# Patient Record
Sex: MALE | Race: BLACK OR AFRICAN AMERICAN
[De-identification: names, ages, dates, MRNs, and addresses within clinical notes are randomized per-mention and may not be internally consistent; named-entity substitution may affect disease eponyms.]

---

## 2019-03-07 ENCOUNTER — HOSPITAL ENCOUNTER (EMERGENCY)
Dept: HOSPITAL 72 - EMR | Age: 24
Discharge: HOME | End: 2019-03-07
Payer: COMMERCIAL

## 2019-03-07 VITALS — HEIGHT: 73 IN | BODY MASS INDEX: 21.87 KG/M2 | WEIGHT: 165 LBS

## 2019-03-07 VITALS — SYSTOLIC BLOOD PRESSURE: 118 MMHG | DIASTOLIC BLOOD PRESSURE: 71 MMHG

## 2019-03-07 DIAGNOSIS — Z87.891: ICD-10-CM

## 2019-03-07 DIAGNOSIS — J40: Primary | ICD-10-CM

## 2019-03-07 PROCEDURE — 99282 EMERGENCY DEPT VISIT SF MDM: CPT

## 2019-03-07 NOTE — EMERGENCY ROOM REPORT
History of Present Illness


General


Chief Complaint:  Flu Like Symptoms


Source:  Patient





Present Illness


HPI


23-year-old male presents to the emergency department complaining of persistent 

10/10 in severity productive cough 3-4 days.  Patient denies fevers he reports 

chills he denies history of asthma he does report history of smoking 

specifically noting that he smokes from a bottle regularly.  Patient reports 

mild 5 out of 10 in severity sore throat which is exacerbated upon coughing or 

swallowing. Denies  ear pain, high fevers, lethargy, neck pain/stiffness, 

irritability, photophobia dehydration, N/V/D. Denies Cp, Palpitations, LOC, AMS

, seizures, paresthesias, or changes in Hearing or vision, no Sudden severe HA. 

Denies hx of smoking, asthma or COPD.


Allergies:  


Coded Allergies:  


     No Known Allergies (Unverified , 3/7/19)





Patient History


Past Medical History:  see triage record


Past Surgical History:  none


Pertinent Family History:  none


Reviewed Nursing Documentation:  PMH: Agreed; PSxH: Agreed





Nursing Documentation-PMH


Past Medical History:  No Stated History





Review of Systems


All Other Systems:  negative except mentioned in HPI





Physical Exam





Vital Signs








  Date Time  Temp Pulse Resp B/P (MAP) Pulse Ox O2 Delivery O2 Flow Rate FiO2


 


3/7/19 13:45 98.2 86 18 118/71 97 Room Air  








Sp02 EP Interpretation:  reviewed, normal


General Appearance:  no apparent distress, alert, GCS 15, non-toxic


Head:  normocephalic, atraumatic


Eyes:  bilateral eye normal inspection, bilateral eye PERRL


ENT:  hearing grossly normal, normal voice


Neck:  full range of motion


Respiratory:  chest non-tender, lungs clear, normal breath sounds, speaking 

full sentences, wheezing - mild/ scant


Cardiovascular #1:  regular rate, rhythm


Gastrointestinal:  normal bowel sounds, non tender, soft


Rectal:  deferred


Genitourinary:  normal inspection


Musculoskeletal:  back normal, gait/station normal, normal range of motion, non-

tender


Neurologic:  alert, oriented x3, responsive, motor strength/tone normal, 

sensory intact, speech normal, grossly normal


Psychiatric:  judgement/insight normal


Skin:  normal color, no rash, warm/dry, well hydrated


Lymphatic:  no adenopathy





Medical Decision Making


PA Attestation


Dr. garrison is my supervising Physician whom patient management has been 

discussed with.


Diagnostic Impression:  


 Primary Impression:  


 Bronchitis


ER Course


23-year-old male presents to the emergency department complaining of persistent 

10/10 in severity productive cough 3-4 days.  Patient denies fevers he reports 

chills he denies history of asthma he does report history of smoking 

specifically noting that he smokes from a bottle regularly.  Patient reports 

mild 5 out of 10 in severity sore throat which is exacerbated upon coughing or 

swallowing. Denies  ear pain, high fevers, lethargy, neck pain/stiffness, 

irritability, photophobia dehydration, N/V/D. Denies Cp, Palpitations, LOC, AMS

, seizures, paresthesias, or changes in Hearing or vision, no Sudden severe HA. 

Denies hx of smoking, asthma or COPD.





Ddx considered but are not limited to URI, pneumonia, PE, strep pharyngitis, 

meningitis.





Vital signs: Pt.is afebrile VS are WNL


H&PE are most consistent with bronchitis





ORDERS: none required at this time, the diagnosis is clinical





ED INTERVENTIONS: None required at this time. 





DISCHARGE: At this time pt. is stable for d/c to home. Will provide printed 

patient care instructions, and any necessary prescriptions. Care plan and 

follow up instructions have been discussed with the patient prior to discharge.





Last Vital Signs








  Date Time  Temp Pulse Resp B/P (MAP) Pulse Ox O2 Delivery O2 Flow Rate FiO2


 


3/7/19 14:08  86 18   Room Air  


 


3/7/19 14:08 98.2   118/71 97   








Disposition:  HOME, SELF-CARE


Condition:  Stable


Scripts


Guaifenesin (Guaifenesin) 1,200 Mg Tab.er.12h


1200 MG PO Q12HR, #20 TAB


   Prov: Lupe Velarde         3/7/19 


Albuterol Sulfate* (ALBUTEROL SULFATE MDI*) 8.5 Gm Hfa.aer.ad


2 PUFF INH Q4H, #1 INH 0 Refills


   Prov: Lupe Velarde         3/7/19 


Codeine/Promethazine Hcl* (PROMETHAZINE-CODEINE SYRUP*) 118 Ml Syrup


5 ML ORAL Q6H PRN for For Cough, #120 ML 0 Refills


   Prov: Lupe Velarde         3/7/19


Patient Instructions:  Acute Bronchitis





Additional Instructions:  


Take medications as directed. 





 ** Follow up with a Primary Care Provider in 3-5 days, even if your symptoms 

have resolved. ** 


--Please review list of primary care clinics, if you do not already have a 

primary care provider





Return sooner to ED if new symptoms occur, or current symptoms become worse. 


Do not drink alcohol, drive, or operate heavy machinery while taking Cough 

Syrup as this may cause drowsiness. 











- Please note that this Emergency Department Report was dictated using [email or web address] technology software, occasionally this can lead to 

erroneous entry secondary to interpretation by the dictation equipment.











Lupe Velarde Mar 7, 2019 14:31

## 2019-03-07 NOTE — NUR
ER DISCHARGE NOTE:

Patient is cleared to be discharged per ERMD, pt is aox4, on room air, with 
stable vital signs. pt was given dc and prescription instructions, pt was able 
to verbalize understanding, pt is able to ambulate with steady gait. pt took 
all belongings.

## 2019-03-15 ENCOUNTER — HOSPITAL ENCOUNTER (EMERGENCY)
Dept: HOSPITAL 72 - EMR | Age: 24
LOS: 1 days | Discharge: HOME | End: 2019-03-16
Payer: COMMERCIAL

## 2019-03-15 VITALS — HEIGHT: 73 IN | BODY MASS INDEX: 21.6 KG/M2 | WEIGHT: 163 LBS

## 2019-03-15 DIAGNOSIS — J02.9: Primary | ICD-10-CM

## 2019-03-15 PROCEDURE — 99282 EMERGENCY DEPT VISIT SF MDM: CPT

## 2019-03-16 VITALS — SYSTOLIC BLOOD PRESSURE: 128 MMHG | DIASTOLIC BLOOD PRESSURE: 84 MMHG

## 2019-03-16 NOTE — NUR
ED Nurse Note:



pt walked in due to cough with phlegm x 3 weeks. pt stated he was seen here 2 
weeks ago and was give promethazine. symptoms started to reoccur 3 days ago.

## 2019-03-16 NOTE — NUR
ER DISCHARGE NOTE:

Patient is cleared to be discharged per ERMD, pt is aox4, on room air, with 
stable vital signs. pt was given dc and prescription instructions, pt was able 
to verbalize understanding, pt id band remvoed . pt is able to ambulate with 
steady gait. pt took all belongings.

## 2019-03-16 NOTE — EMERGENCY ROOM REPORT
History of Present Illness


General


Chief Complaint:  Upper Respiratory Illness


Source:  Patient





Present Illness


HPI


Is a 23-year-old male with no past medical history.  He presents with chief 

complaint of sore throat.  Onset for last 3 days.  Pain is 10 out of 10.  Worse 

with swallowing.  Also with some congestion.  No nausea no vomiting.  Has not 

anything for it.  Subjective fever and chills


Allergies:  


Coded Allergies:  


     No Known Allergies (Unverified , 3/7/19)





Patient History


Past Medical History:  see triage record, old chart reviewed


Past Surgical History:  none


Pertinent Family History:  none


Social History:  Denies: smoking


Immunizations:  other


Reviewed Nursing Documentation:  PMH: Agreed; PSxH: Agreed





Nursing Documentation-PMH


Past Medical History:  No Stated History





Review of Systems


Eye:  Denies: eye pain, blurred vision


ENT:  Reports: throat pain; Denies: ear pain, nose congestion, throat swelling


Respiratory:  Denies: cough, shortness of breath


Cardiovascular:  Denies: chest pain, palpitations


Gastrointestinal:  Denies: abdominal pain, diarrhea, nausea, vomiting


Musculoskeletal:  Denies: back pain, joint pain


Skin:  Denies: rash


Neurological:  Denies: headache, numbness


Endocrine:  Denies: increased thirst, increased urine


Hematologic/Lymphatic:  Denies: easy bruising


All Other Systems:  negative except mentioned in HPI





Physical Exam





Vital Signs








  Date Time  Temp Pulse Resp B/P (MAP) Pulse Ox O2 Delivery O2 Flow Rate FiO2


 


3/16/19 00:00 97.3 64 14 128/84 97 Room Air  





vitals normal


Sp02 EP Interpretation:  reviewed, normal


General Appearance:  well appearing, no apparent distress, alert


Head:  normocephalic, atraumatic


Eyes:  bilateral eye PERRL, bilateral eye EOMI


ENT:  hearing grossly normal, tonsillar swelling, pharyngeal erythema


Neck:  full range of motion, supple, no meningismus


Respiratory:  chest non-tender, lungs clear, normal breath sounds


Cardiovascular #1:  regular rate, rhythm, no murmur


Gastrointestinal:  normal bowel sounds, non tender, no mass, no organomegaly, 

no bruit, non-distended


Musculoskeletal:  back normal, gait/station normal, normal range of motion


Psychiatric:  mood/affect normal


Skin:  warm/dry





Medical Decision Making


Diagnostic Impression:  


 Primary Impression:  


 Acute pharyngitis


 Qualified Codes:  J02.9 - Acute pharyngitis, unspecified


ER Course


Patient presents with pharyngitis.  No evidence of any sepsis, meningitis, 

peritonsillar abscess or retropharyngeal abscess.  No evidence of Tip 

angina.  We'll discharge home.





Last Vital Signs








  Date Time  Temp Pulse Resp B/P (MAP) Pulse Ox O2 Delivery O2 Flow Rate FiO2


 


3/16/19 00:09 97.3 64 14 128/84 97 Room Air  








Status:  improved


Disposition:  HOME, SELF-CARE


Condition:  Stable


Scripts


Ibuprofen (CHILD IBUPROFEN) 100 Mg/5 Ml Oral.susp


600 MG PO Q6HR, #118 ML


   Prov: Sean Mistry MD         3/16/19 


Azithromycin* (ZITHROMAX*) 250 Mg Tablet


250 MG ORAL DAILY, #6 TAB 0 Refills


   Take two tables once daily for 1 day, then one tablet once daily for 4


   days.


   Prov: Sean Mistry MD         3/16/19





Additional Instructions:  


Increase fluids.  Salt water gargle.  Follow-up with your doctor in 7 days.  

Return if worse.











eSan Mistry MD Mar 16, 2019 00:47